# Patient Record
Sex: MALE | Race: WHITE | NOT HISPANIC OR LATINO | Employment: FULL TIME | URBAN - METROPOLITAN AREA
[De-identification: names, ages, dates, MRNs, and addresses within clinical notes are randomized per-mention and may not be internally consistent; named-entity substitution may affect disease eponyms.]

---

## 2017-02-08 DIAGNOSIS — J44.9 CHRONIC OBSTRUCTIVE PULMONARY DISEASE, UNSPECIFIED COPD TYPE (HCC): ICD-10-CM

## 2017-02-08 RX ORDER — BUDESONIDE AND FORMOTEROL FUMARATE DIHYDRATE 160; 4.5 UG/1; UG/1
AEROSOL RESPIRATORY (INHALATION)
Qty: 3 INHALER | Refills: 3 | Status: SHIPPED | OUTPATIENT
Start: 2017-02-08 | End: 2018-04-06 | Stop reason: SDUPTHER

## 2017-02-08 NOTE — TELEPHONE ENCOUNTER
Caller Name: Tony Gonsales                 Call Back Number: [phone] (home)         Patient approves a detailed voicemail message: N\A    Have we ever prescribed this med? Yes.  If yes, what date? 7/26/16    Last OV: 7/26/16    Next OV: No Pending Appointment     DX: COPD    Medications:  Current Outpatient Prescriptions   Medication Sig Dispense Refill   • montelukast (SINGULAIR) 10 MG Tab TAKE 1 TABLET NIGHTLY AT BEDTIME 90 Tab 0   • Azelastine HCl 0.15 % Solution USE 1 TO 2 SPRAYS IN EACH NOSTRIL TWICE A DAY 90 mL 0   • budesonide-formoterol (SYMBICORT) 160-4.5 MCG/ACT Aerosol Inhale 2 Puffs by mouth 2 Times a Day. Inhale 2 puffs by mouth twice daily with spacer.  Rinse mouth after each use.     • azithromycin (ZITHROMAX) 250 MG TABS a 6 Each 0   • benzonatate (TESSALON) 200 MG capsule Take 1 Cap by mouth 3 times a day as needed for Cough for 20 doses. 20 Cap 0   • fexofenadine (ALLEGRA) 180 MG tablet Take 180 mg by mouth every day.     • tiotropium (SPIRIVA) 18 MCG CAPS Inhale 18 mcg by mouth every day.     • esomeprazole (NEXIUM) 40 MG capsule Take 40 mg by mouth every morning before breakfast.     • mometasone (NASONEX) 50 MCG/ACT nasal spray Spray 2 Sprays in nose every day. Each nostril     • albuterol (VENTOLIN OR PROVENTIL) 108 (90 BASE) MCG/ACT AERS Inhale 2 Puffs by mouth every 6 hours as needed for Shortness of Breath. 8.5 g 3     No current facility-administered medications for this visit.

## 2017-02-27 ENCOUNTER — HOSPITAL ENCOUNTER (OUTPATIENT)
Dept: RADIOLOGY | Facility: MEDICAL CENTER | Age: 57
End: 2017-02-27
Attending: FAMILY MEDICINE
Payer: COMMERCIAL

## 2017-02-27 DIAGNOSIS — R05.9 COUGH: ICD-10-CM

## 2017-02-27 PROCEDURE — 71020 DX-CHEST-2 VIEWS: CPT

## 2017-04-03 RX ORDER — AZELASTINE HCL 205.5 UG/1
SPRAY NASAL
Qty: 90 ML | Refills: 6 | OUTPATIENT
Start: 2017-04-03

## 2017-05-10 DIAGNOSIS — J30.9 ALLERGIC RHINITIS, UNSPECIFIED ALLERGIC RHINITIS TRIGGER, UNSPECIFIED RHINITIS SEASONALITY: ICD-10-CM

## 2017-05-11 RX ORDER — MONTELUKAST SODIUM 10 MG/1
TABLET ORAL
Qty: 90 TAB | Refills: 0 | Status: SHIPPED | OUTPATIENT
Start: 2017-05-11 | End: 2017-08-18 | Stop reason: SDUPTHER

## 2017-05-11 NOTE — TELEPHONE ENCOUNTER
Caller Name: Tony Gonsales                 Call Back Number: [phone] (home)         Patient approves a detailed voicemail message: N\A    Have we ever prescribed this med? Yes.  If yes, what date? 12/30/16    Last OV: 11/3/15- Kim Shiraz    Next OV: 7/3/17- Kim Shiraz     DX: Allergic Rhinitis     Medications: Montelukast     Current Outpatient Prescriptions   Medication Sig Dispense Refill   • SYMBICORT 160-4.5 MCG/ACT Aerosol USE 2 INHALATIONS TWICE A DAY WITH SPACER RINSE MOUTH AFTER EACH USE 3 Inhaler 3   • montelukast (SINGULAIR) 10 MG Tab TAKE 1 TABLET NIGHTLY AT BEDTIME 90 Tab 0   • Azelastine HCl 0.15 % Solution USE 1 TO 2 SPRAYS IN EACH NOSTRIL TWICE A DAY 90 mL 0   • azithromycin (ZITHROMAX) 250 MG TABS a 6 Each 0   • benzonatate (TESSALON) 200 MG capsule Take 1 Cap by mouth 3 times a day as needed for Cough for 20 doses. 20 Cap 0   • fexofenadine (ALLEGRA) 180 MG tablet Take 180 mg by mouth every day.     • tiotropium (SPIRIVA) 18 MCG CAPS Inhale 18 mcg by mouth every day.     • esomeprazole (NEXIUM) 40 MG capsule Take 40 mg by mouth every morning before breakfast.     • mometasone (NASONEX) 50 MCG/ACT nasal spray Spray 2 Sprays in nose every day. Each nostril     • albuterol (VENTOLIN OR PROVENTIL) 108 (90 BASE) MCG/ACT AERS Inhale 2 Puffs by mouth every 6 hours as needed for Shortness of Breath. 8.5 g 3     No current facility-administered medications for this visit.

## 2017-05-24 ENCOUNTER — HOSPITAL ENCOUNTER (OUTPATIENT)
Dept: LAB | Facility: MEDICAL CENTER | Age: 57
End: 2017-05-24
Attending: SURGERY
Payer: COMMERCIAL

## 2017-05-24 ENCOUNTER — HOSPITAL ENCOUNTER (OUTPATIENT)
Dept: CARDIOLOGY | Facility: MEDICAL CENTER | Age: 57
End: 2017-05-24
Attending: SURGERY
Payer: COMMERCIAL

## 2017-05-24 LAB
ANION GAP SERPL CALC-SCNC: 8 MMOL/L (ref 0–11.9)
BUN SERPL-MCNC: 12 MG/DL (ref 8–22)
CALCIUM SERPL-MCNC: 8.9 MG/DL (ref 8.5–10.5)
CHLORIDE SERPL-SCNC: 107 MMOL/L (ref 96–112)
CO2 SERPL-SCNC: 24 MMOL/L (ref 20–33)
CREAT SERPL-MCNC: 0.89 MG/DL (ref 0.5–1.4)
EKG IMPRESSION: NORMAL
GFR SERPL CREATININE-BSD FRML MDRD: >60 ML/MIN/1.73 M 2
GLUCOSE SERPL-MCNC: 211 MG/DL (ref 65–99)
POTASSIUM SERPL-SCNC: 3.7 MMOL/L (ref 3.6–5.5)
SODIUM SERPL-SCNC: 139 MMOL/L (ref 135–145)

## 2017-05-24 PROCEDURE — 80048 BASIC METABOLIC PNL TOTAL CA: CPT

## 2017-05-24 PROCEDURE — 36415 COLL VENOUS BLD VENIPUNCTURE: CPT

## 2017-05-24 PROCEDURE — 93010 ELECTROCARDIOGRAM REPORT: CPT | Performed by: INTERNAL MEDICINE

## 2017-05-24 PROCEDURE — 93005 ELECTROCARDIOGRAM TRACING: CPT | Performed by: SURGERY

## 2017-06-02 ENCOUNTER — HOSPITAL ENCOUNTER (OUTPATIENT)
Dept: RADIOLOGY | Facility: MEDICAL CENTER | Age: 57
End: 2017-06-02
Attending: NURSE PRACTITIONER
Payer: COMMERCIAL

## 2017-06-02 VITALS
RESPIRATION RATE: 16 BRPM | DIASTOLIC BLOOD PRESSURE: 96 MMHG | HEIGHT: 76 IN | TEMPERATURE: 98 F | HEART RATE: 75 BPM | BODY MASS INDEX: 32.88 KG/M2 | OXYGEN SATURATION: 95 % | WEIGHT: 270 LBS | SYSTOLIC BLOOD PRESSURE: 145 MMHG

## 2017-06-02 DIAGNOSIS — M54.16 LUMBAR RADICULOPATHY: ICD-10-CM

## 2017-06-02 PROCEDURE — 72110 X-RAY EXAM L-2 SPINE 4/>VWS: CPT

## 2017-06-02 PROCEDURE — 99152 MOD SED SAME PHYS/QHP 5/>YRS: CPT

## 2017-06-02 PROCEDURE — 72148 MRI LUMBAR SPINE W/O DYE: CPT

## 2017-06-02 PROCEDURE — 700111 HCHG RX REV CODE 636 W/ 250 OVERRIDE (IP)

## 2017-06-02 ASSESSMENT — PAIN SCALES - GENERAL
PAINLEVEL_OUTOF10: 0

## 2017-06-02 NOTE — IP AVS SNAPSHOT
" <p align=\"LEFT\"><IMG SRC=\"[email or web address]\" alt=\"Image\" WIDTH=\"50%\" HEIGHT=\"200\" BORDER=\"\"></p>      `           Tony Gonsales   MRN: [id number]    Department:  [facility] MRI [address]   Date of Visit:              `  Discharge Instructions       MRI ADULT DISCHARGE INSTRUCTIONS    You have been medicated today for your scan. Please follow the instructions below to ensure your safe recovery. If you have any questions or problems, feel free to call us at [phone] or [phone].     1.   Have someone stay with you to assist you as needed.    2.   Do not drive or operate any mechanical devices.    3.   Do not perform any activity that requires concentration. Make no major decisions over the next 24 hours.     4.   Be careful changing positions from laying down to sitting or standing, as you may become dizzy.     5.   Do not drink alcohol for 48 hours.    6.   There are no restrictions for eating your normal meals. Drink fluids.    7.   You may continue your usual medications for pain, tranquilizers, muscle relaxants or sedatives when awake.     8.   Tomorrow, you may continue your normal daily activities.     9.   Pressure dressing on 10 - 15 minutes. If swelling or bleeding occurs when removed, continue placing direct pressure on injection site for another 5 minutes, or until bleeding stops.     I have been informed of and understand the above discharge instructions. Midazolam (VERSED)  What is this medicine?  You were given MIDAZOLAM (SEB shepherd) for your procedure today. This medication is a benzodiazepine. It is used to cause relaxation or sleep before surgery and to block the memory of the procedure.  This medicine may be used for other purposes; ask your health care provider or pharmacist if you have questions.  What side effects may I notice from receiving this medicine?  Side effects that you should report to your doctor or health care professional as soon as possible:  • allergic " reactions like skin rash, itching or hives, swelling of the face, lips, or tongue  • breathing problems  • confusion  • dizziness or lightheadedness  • fast, irregular heartbeat  • halluninations during recovery  • numbness or tingling in the hands or feet  • pain, redness, or swelling at site where injected  • seizures  Side effects that usually do not require medical attention (report to your doctor or health care professional if they continue or are bothersome):  • coughing  • headache  • hiccups  • involuntary eye and muscle movements  • loss of memory of events just before, during, and after use  • nausea, vomiting  • speech problems  • tiredness  • trouble sleeping or nightmares  This list may not describe all possible side effects. Call your doctor for medical advice about side effects. You may report side effects to FDA at 2-629-FDA-3306.    Fentanyl  What is this medicine?  You were given FENTANYL (FEN ta nil) for your procedure today, it is a pain reliever. It is used to treat breakthrough pain that your long acting pain medicine does not control. Do not use this medicine for a pain that will go away in a few days like pain from surgery, doctor or dentist visits.   This medicine may be used for other purposes; ask your health care provider or pharmacist if you have questions.  What side effects may I notice from receiving this medicine?  Side effects that you should report to your doctor or health care professional as soon as possible:  • allergic reactions like skin rash, itching or hives, swelling of the face, lips, or tongue  • breathing problems  • changes in vision  • confusion  • dry mouth  • feeling faint, lightheaded  • hallucination  • irregular heartbeat  • mouth pain, sores  • problems with balance, talking, walking  • trouble passing urine or change in the amount of urine  • unusual bleeding or bruising  • unusually weak or tired  Side effects that usually do not require medical attention  (report to your doctor or health care professional if they continue or are bothersome):  • dizzy  • headache  • loss of appetite  • nausea, vomiting  • sweating  • tingling in mouth  This list may not describe all possible side effects. Call your doctor for medical advice about side effects. You may report side effects to FDA at 8-966-DCE-5627.       `       Diet / Nutrition:    Follow any diet instructions given to you by your doctor or the dietician, including how much salt (sodium) you are allowed each day.    If you are overweight, talk to your doctor about a weight reduction plan.    Activity:    Remain physically active following your doctor's instructions about exercise and activity.    Rest often.     Any time you become even a little tired or short of breath, SIT DOWN and rest.    Worsening Symptoms:    Report any of the following signs and symptoms to the doctor's office immediately:    *Pain of jaw, arm, or neck  *Chest pain not relieved by medication                               *Dizziness or loss of consciousness  *Difficulty breathing even when at rest   *More tired than usual                                       *Bleeding drainage or swelling of surgical site  *Swelling of feet, ankles, legs or stomach                 *Fever (>100ºF)  *Pink or blood tinged sputum  *Weight gain (3lbs/day or 5lbs /week)           *Shock from internal defibrillator (if applicable)  *Palpitations or irregular heartbeats                *Cool and/or numb extremities    Stroke Awareness    Common Risk Factors for Stroke include:    Age  Atrial Fibrillation  Carotid Artery Stenosis  Diabetes Mellitus  Excessive alcohol consumption  High blood pressure  Overweight   Physical inactivity  Smoking    Warning signs and symptoms of a stroke include:    *Sudden numbness or weakness of the face, arm or leg (especially on one side of the body).  *Sudden confusion, trouble speaking or understanding.  *Sudden trouble seeing in one or  both eyes.  *Sudden trouble walking, dizziness, loss of balance or coordination.Sudden severe headache with no known cause.    It is very important to get treatment quickly when a stroke occurs. If you experience any of the above warning signs, call 911 immediately.                    `     Quit Smoking / Tobacco Use:    I understand the use of any tobacco products increases my chance of suffering from future heart disease or stroke and could cause other illnesses which may shorten my life. Quitting the use of tobacco products is the single most important thing I can do to improve my health. For further information on smoking / tobacco cessation call a Toll Free Quit Line at 1-616.283.4188 (*[employer] [place]) or 1-390.817.2145 ([employer]) or you can access the web based program at www.[employer].org.    [place] Tobacco Users Help Line:  (230) 537-8328       Toll Free: 1-598.742.5183  Quit Tobacco Program [facility] (891)442-4266    Crisis Hotline:    [place] Crisis Hotline:  9-606-VJGKIAA or 1-896.841.3024    [place] Crisis Hotline:    1-280.531.7782 or 454-325-2781    Discharge Survey:   Thank you for choosing [facility]. We hope we did everything we could to make your hospital stay a pleasant one. You may be receiving a phone survey and we would appreciate your time and participation in answering the questions. Your input is very valuable to us in our efforts to improve our service to our patients and their families.        My signature on this form indicates that:    1. I have reviewed and understand the above information.  2. My questions regarding this information have been answered to my satisfaction.  3. I have formulated a plan with my discharge nurse to obtain my prescribed medications for home.                   `           Patient or Caregiver Signature:  ____________________________________________________________    Date:   ____________________________________________________________       `

## 2017-06-02 NOTE — DISCHARGE INSTRUCTIONS
MRI ADULT DISCHARGE INSTRUCTIONS    You have been medicated today for your scan. Please follow the instructions below to ensure your safe recovery. If you have any questions or problems, feel free to call us at [phone] or [phone].     1.   Have someone stay with you to assist you as needed.    2.   Do not drive or operate any mechanical devices.    3.   Do not perform any activity that requires concentration. Make no major decisions over the next 24 hours.     4.   Be careful changing positions from laying down to sitting or standing, as you may become dizzy.     5.   Do not drink alcohol for 48 hours.    6.   There are no restrictions for eating your normal meals. Drink fluids.    7.   You may continue your usual medications for pain, tranquilizers, muscle relaxants or sedatives when awake.     8.   Tomorrow, you may continue your normal daily activities.     9.   Pressure dressing on 10 - 15 minutes. If swelling or bleeding occurs when removed, continue placing direct pressure on injection site for another 5 minutes, or until bleeding stops.     I have been informed of and understand the above discharge instructions. Midazolam (VERSED)  What is this medicine?  You were given MIDAZOLAM (SEB shepherd) for your procedure today. This medication is a benzodiazepine. It is used to cause relaxation or sleep before surgery and to block the memory of the procedure.  This medicine may be used for other purposes; ask your health care provider or pharmacist if you have questions.  What side effects may I notice from receiving this medicine?  Side effects that you should report to your doctor or health care professional as soon as possible:  • allergic reactions like skin rash, itching or hives, swelling of the face, lips, or tongue  • breathing problems  • confusion  • dizziness or lightheadedness  • fast, irregular heartbeat  • halluninations during recovery  • numbness or tingling in the hands or feet  • pain, redness,  or swelling at site where injected  • seizures  Side effects that usually do not require medical attention (report to your doctor or health care professional if they continue or are bothersome):  • coughing  • headache  • hiccups  • involuntary eye and muscle movements  • loss of memory of events just before, during, and after use  • nausea, vomiting  • speech problems  • tiredness  • trouble sleeping or nightmares  This list may not describe all possible side effects. Call your doctor for medical advice about side effects. You may report side effects to FDA at 1-175-IAE-3996.    Fentanyl  What is this medicine?  You were given FENTANYL (FEN ta nil) for your procedure today, it is a pain reliever. It is used to treat breakthrough pain that your long acting pain medicine does not control. Do not use this medicine for a pain that will go away in a few days like pain from surgery, doctor or dentist visits.   This medicine may be used for other purposes; ask your health care provider or pharmacist if you have questions.  What side effects may I notice from receiving this medicine?  Side effects that you should report to your doctor or health care professional as soon as possible:  • allergic reactions like skin rash, itching or hives, swelling of the face, lips, or tongue  • breathing problems  • changes in vision  • confusion  • dry mouth  • feeling faint, lightheaded  • hallucination  • irregular heartbeat  • mouth pain, sores  • problems with balance, talking, walking  • trouble passing urine or change in the amount of urine  • unusual bleeding or bruising  • unusually weak or tired  Side effects that usually do not require medical attention (report to your doctor or health care professional if they continue or are bothersome):  • dizzy  • headache  • loss of appetite  • nausea, vomiting  • sweating  • tingling in mouth  This list may not describe all possible side effects. Call your doctor for medical advice about  side effects. You may report side effects to FDA at 3-171-FDA-6737.

## 2017-07-03 ENCOUNTER — SLEEP CENTER VISIT (OUTPATIENT)
Dept: SLEEP MEDICINE | Facility: MEDICAL CENTER | Age: 57
End: 2017-07-03
Payer: COMMERCIAL

## 2017-07-03 VITALS
OXYGEN SATURATION: 93 % | WEIGHT: 270 LBS | HEART RATE: 95 BPM | RESPIRATION RATE: 18 BRPM | DIASTOLIC BLOOD PRESSURE: 100 MMHG | HEIGHT: 76 IN | BODY MASS INDEX: 32.88 KG/M2 | SYSTOLIC BLOOD PRESSURE: 150 MMHG

## 2017-07-03 DIAGNOSIS — G47.33 OSA (OBSTRUCTIVE SLEEP APNEA): ICD-10-CM

## 2017-07-03 DIAGNOSIS — J44.9 CHRONIC OBSTRUCTIVE PULMONARY DISEASE, UNSPECIFIED COPD TYPE (HCC): ICD-10-CM

## 2017-07-03 PROCEDURE — 99213 OFFICE O/P EST LOW 20 MIN: CPT | Performed by: NURSE PRACTITIONER

## 2017-07-03 RX ORDER — ATORVASTATIN CALCIUM 20 MG/1
40 TABLET, FILM COATED ORAL
COMMUNITY
Start: 2017-04-15
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
## 2017-07-03 NOTE — MR AVS SNAPSHOT
"        Tony Gonsales   7/3/2017 8:40 AM   Sleep Center Visit   MRN: [id number]    Department:  [facility]   Dept Phone:  [phone]    Description:  Male : 1960   Provider:  MEGHA Camp           Reason for Visit     Follow-Up LIAN      Allergies as of 7/3/2017     Allergen Noted Reactions    Codeine 2013       nausea    Tetracyclines 2013       nausea      Vital Signs     Blood Pressure Pulse Respirations Height Weight Body Mass Index    150/100 mmHg 95 18 1.93 m (6' 3.98\") 122.471 kg (270 lb) 32.88 kg/m2    Oxygen Saturation Smoking Status                93% Former Smoker          Basic Information     Date Of Birth Sex Race Ethnicity Preferred Language    1960 Male White Non- English      Your appointments     2017  8:00 AM   PROCEDURE 15 with Jakub Welsh M.D.   [facility] (--)    [address] [place] [address]   [phone]           Your procedure is scheduled at Special Procedures at [facility] located at [address] [place] [address] just east of the main [place]. Please check in at the front lobby desk 1 hour prior to your appointment time. For your safety, please have a ride home with a responsible adult.             Sep 19, 2017  8:00 AM   Follow UP with MEGHA Camp   [facility] (--)    [address] [place] [id number] [phone]              Problem List              ICD-10-CM Priority Class Noted - Resolved    Unstable angina (CMS-HCC) I20.0   5/15/2013 - Present      Health Maintenance        Date Due Completion Dates    IMM DTaP/Tdap/Td Vaccine (1 - Tdap) 1980 ---    COLONOSCOPY 2011 ---    IMM INFLUENZA (1) 2017 ---            Current Immunizations     Pneumococcal polysaccharide vaccine (PPSV-23) 5/15/2013 12:43 PM      Below and/or attached are the medications your provider expects you to take. Review all of your home medications and newly ordered medications " with your provider and/or pharmacist. Follow medication instructions as directed by your provider and/or pharmacist. Please keep your medication list with you and share with your provider. Update the information when medications are discontinued, doses are changed, or new medications (including over-the-counter products) are added; and carry medication information at all times in the event of emergency situations     Allergies:  CODEINE - (reactions not documented)     TETRACYCLINES - (reactions not documented)               Medications  Valid as of: July 03, 2017 -  8:53 AM    Generic Name Brand Name Tablet Size Instructions for use    Albuterol Sulfate (Aero Soln) albuterol 108 (90 BASE) MCG/ACT Inhale 2 Puffs by mouth every 6 hours as needed for Shortness of Breath.        Atorvastatin Calcium (Tab) LIPITOR 20 MG         Azelastine HCl (Solution) Azelastine HCl 0.15 % USE 1 TO 2 SPRAYS IN EACH NOSTRIL TWICE A DAY        Azithromycin (Tab) ZITHROMAX 250 MG a        Benzonatate (Cap) TESSALON 200 MG Take 1 Cap by mouth 3 times a day as needed for Cough for 20 doses.        Budesonide-Formoterol Fumarate (Aerosol) SYMBICORT 160-4.5 MCG/ACT USE 2 INHALATIONS TWICE A DAY WITH SPACER RINSE MOUTH AFTER EACH USE        Esomeprazole Magnesium (CAPSULE DELAYED RELEASE) NEXIUM 40 MG Take 40 mg by mouth every morning before breakfast.        Fexofenadine HCl (Tab) ALLEGRA 180 MG Take 180 mg by mouth every day.        Mometasone Furoate (Suspension) NASONEX 50 MCG/ACT Spray 2 Sprays in nose every day. Each nostril        Montelukast Sodium (Tab) SINGULAIR 10 MG TAKE 1 TABLET NIGHTLY AT BEDTIME (NEED OFFICE VISIT)        Tiotropium Bromide Monohydrate (Cap) SPIRIVA 18 MCG Inhale 18 mcg by mouth every day.        .                 Medicines prescribed today were sent to:     [facility] - [facility], [place] [id number] [place] [address] [facility] [address]    Phone: [phone] Fax: [phone]      Open 24 Hours?: No      Medication refill instructions:       If your prescription bottle indicates you have medication refills left, it is not necessary to call your provider’s office. Please contact your pharmacy and they will refill your medication.    If your prescription bottle indicates you do not have any refills left, you may request refills at any time through one of the following ways: The online Scour Prevention system (except Urgent Care), by calling your provider’s office, or by asking your pharmacy to contact your provider’s office with a refill request. Medication refills are processed only during regular business hours and may not be available until the next business day. Your provider may request additional information or to have a follow-up visit with you prior to refilling your medication.   *Please Note: Medication refills are assigned a new Rx number when refilled electronically. Your pharmacy may indicate that no refills were authorized even though a new prescription for the same medication is available at the pharmacy. Please request the medicine by name with the pharmacy before contacting your provider for a refill.        Instructions    1. Order for new CPAP 7 cm with heated humidification and associated supplies.  2. Patient is encouraged to use his machine every night for as long as possible.  3. Clean equipment weekly replacement supplies as allowed by insurance.            Scour Prevention Access Code: [email or web address]  Expires: 8/2/2017  8:21 AM    Scour Prevention  A secure, online tool to manage your health information     [employer]’s Scour Prevention® is a secure, online tool that connects you to your personalized health information from the privacy of your home -- day or night - making it very easy for you to manage your healthcare. Once the activation process is completed, you can even access your medical information using the Scour Prevention eugene, which is available for free in the Apple Eugene store or Google Play  store.     Swopboard provides the following levels of access (as shown below):   My Chart Features   [facility] Doctor [facility]  Specialists [facility]  Urgent  Care Non-[facility]  Primary Care  Doctor   Email your healthcare team securely and privately 24/7 X X X    Manage appointments: schedule your next appointment; view details of past/upcoming appointments X      Request prescription refills. X      View recent personal medical records, including lab and immunizations X X X X   View health record, including health history, allergies, medications X X X X   Read reports about your outpatient visits, procedures, consult and ER notes X X X X   See your discharge summary, which is a recap of your hospital and/or ER visit that includes your diagnosis, lab results, and care plan. X X       How to register for Swopboard:  1. Go to  [email or web address].  2. Click on the Sign Up Now box, which takes you to the New Member Sign Up page. You will need to provide the following information:  a. Enter your Swopboard Access Code exactly as it appears at the top of this page. (You will not need to use this code after you’ve completed the sign-up process. If you do not sign up before the expiration date, you must request a new code.)   b. Enter your date of birth.   c. Enter your home email address.   d. Click Submit, and follow the next screen’s instructions.  3. Create a Swopboard ID. This will be your Swopboard login ID and cannot be changed, so think of one that is secure and easy to remember.  4. Create a Swopboard password. You can change your password at any time.  5. Enter your Password Reset Question and Answer. This can be used at a later time if you forget your password.   6. Enter your e-mail address. This allows you to receive e-mail notifications when new information is available in Swopboard.  7. Click Sign Up. You can now view your health information.    For assistance activating your Swopboard account, call [phone]

## 2017-07-03 NOTE — PATIENT INSTRUCTIONS
1. Order for new CPAP 7 cm with heated humidification and associated supplies.  2. Patient is encouraged to use his machine every night for as long as possible.  3. Clean equipment weekly replacement supplies as allowed by insurance.

## 2017-07-17 ENCOUNTER — APPOINTMENT (OUTPATIENT)
Dept: PAIN MANAGEMENT | Facility: REHABILITATION | Age: 57
End: 2017-07-17
Attending: ANESTHESIOLOGY
Payer: COMMERCIAL

## 2017-08-18 DIAGNOSIS — J30.89 ALLERGIC RHINITIS DUE TO OTHER ALLERGIC TRIGGER, UNSPECIFIED RHINITIS SEASONALITY: ICD-10-CM

## 2017-08-21 RX ORDER — MONTELUKAST SODIUM 10 MG/1
TABLET ORAL
Qty: 90 TAB | Refills: 3 | Status: SHIPPED | OUTPATIENT
Start: 2017-08-21 | End: 2020-10-04

## 2017-08-21 NOTE — TELEPHONE ENCOUNTER
Have we ever prescribed this med? Yes.  If yes, what date? 05/11/2017    Last OV: 07/03/2017 - Kim Weldon     Next OV: 09/19/2017 - Kim Weldon     DX: Allergic Rhinitis    Medications: Singulair

## 2017-09-19 ENCOUNTER — SLEEP CENTER VISIT (OUTPATIENT)
Dept: SLEEP MEDICINE | Facility: MEDICAL CENTER | Age: 57
End: 2017-09-19
Payer: COMMERCIAL

## 2017-09-19 VITALS
HEIGHT: 75 IN | OXYGEN SATURATION: 95 % | RESPIRATION RATE: 16 BRPM | TEMPERATURE: 99 F | WEIGHT: 272 LBS | DIASTOLIC BLOOD PRESSURE: 102 MMHG | SYSTOLIC BLOOD PRESSURE: 150 MMHG | BODY MASS INDEX: 33.82 KG/M2 | HEART RATE: 97 BPM

## 2017-09-19 DIAGNOSIS — G47.33 OSA (OBSTRUCTIVE SLEEP APNEA): ICD-10-CM

## 2017-09-19 DIAGNOSIS — J44.9 CHRONIC OBSTRUCTIVE PULMONARY DISEASE, UNSPECIFIED COPD TYPE (HCC): ICD-10-CM

## 2017-09-19 PROCEDURE — 99213 OFFICE O/P EST LOW 20 MIN: CPT | Performed by: NURSE PRACTITIONER

## 2017-09-19 NOTE — PROGRESS NOTES
Chief Complaint   Patient presents with   • Apnea     Follow Up         HPI:  This is a 57 y.o. male with a history of chronic obstructive pulmonary disease and obstructive sleep apnea. Pulmonary function tests from 11/20/15 indicates FEV1 3.37 L, 72% predicted, FEV1/FVC 71%, and DLCO 109% predicted. The patient is compliant with Symbicort 160/4.5 µg 2 inhalations twice daily, Singulair daily, Allegra daily, and Ventolin and albuterol nebulized treatments as needed. The patient's been doing fairly well. We've had some windy days which will smoke and the area SCDs breathing quite nicely. He denies fevers, chills, sweats.    Polysomnogram indicates AHI 16.2 and minimum saturation 81%. The patient is compliant with APAP 5-12 cm. the patient is using his machine 97% of the time for 6 hours 18 minutes. His AHI is been reduced to 0.2. His average pressure is 11 cm. We will increase his pressure to 5-15 centimeters. He has no problems tolerating his mask or pressure. He has been having some back pain which has been interfering with the amount of sleep ease been obtaining here recently.    Past Medical History:   Diagnosis Date   • LIAN (obstructive sleep apnea) 7/3/2017    AHI 16.2, minimum saturation 81%, on APAP 5-12 cm.   • COPD (chronic obstructive pulmonary disease) (CMS-[facility]) 7/3/2017   • Allergic rhinitis    • Chronic back pain    • COPD    • GERD (gastroesophageal reflux disease)    • Sleep apnea        Past Surgical History:   Procedure Laterality Date   • OH SPINE SURGERY PROCEDURE UNLISTED  1989   • OTHER      Knee Surgery       Social History   Substance Use Topics   • Smoking status: Former Smoker     Packs/day: 1.00     Years: 35.00     Types: Cigarettes     Start date: 1/1/1975     Quit date: 9/14/2012   • Smokeless tobacco: Never Used   • Alcohol use 0.6 - 1.8 oz/week     1 - 3 Cans of beer per week      Comment: occasionally       ROS:   Constitutional: Denies fevers, chills, sweats, fatigue, and weight  "loss.  Eyes: Denies glasses.  Ears/nose/mouth/throat: Denies injury.  Cardiovascular: Denies chest pain, tightness.  Respiratory: See history of present illness.  GI: Denies heartburn, difficulty swallowing, nausea, and vomiting.  Neurological: Denies frequent headaches, dizziness, weakness.    Vitals:  Vitals:    09/19/17 0802   Height: 1.905 m (6' 3\")   Weight: 123.4 kg (272 lb)   Weight % change since last entry.: 0 %   BP: 150/102   Pulse: 97   BMI (Calculated): 34   Resp: 16   Temp: 37.2 °C (99 °F)   O2 sat % room air: 95 %       Allergies:  Codeine and Tetracyclines    Medications:  Current Outpatient Prescriptions   Medication Sig Dispense Refill   • montelukast (SINGULAIR) 10 MG Tab TAKE 1 TABLET NIGHTLY AT BEDTIME (NEED OFFICE VISIT) 90 Tab 3   • atorvastatin (LIPITOR) 20 MG Tab      • SYMBICORT 160-4.5 MCG/ACT Aerosol USE 2 INHALATIONS TWICE A DAY WITH SPACER RINSE MOUTH AFTER EACH USE 3 Inhaler 3   • Azelastine HCl 0.15 % Solution USE 1 TO 2 SPRAYS IN EACH NOSTRIL TWICE A DAY 90 mL 0   • azithromycin (ZITHROMAX) 250 MG TABS a 6 Each 0   • benzonatate (TESSALON) 200 MG capsule Take 1 Cap by mouth 3 times a day as needed for Cough for 20 doses. 20 Cap 0   • fexofenadine (ALLEGRA) 180 MG tablet Take 180 mg by mouth every day.     • tiotropium (SPIRIVA) 18 MCG CAPS Inhale 18 mcg by mouth every day.     • esomeprazole (NEXIUM) 40 MG capsule Take 40 mg by mouth every morning before breakfast.     • mometasone (NASONEX) 50 MCG/ACT nasal spray Spray 2 Sprays in nose every day. Each nostril     • albuterol (VENTOLIN OR PROVENTIL) 108 (90 BASE) MCG/ACT AERS Inhale 2 Puffs by mouth every 6 hours as needed for Shortness of Breath. 8.5 g 3     No current facility-administered medications for this visit.        PHYSICAL EXAM:  Appearance: Well-developed, well-nourished, no acute distress.  Eyes. PERRL.  Hearing: Grossly intact.  Oropharynx: Tongue normal, posterior pharynx without erythema or exudate.  Respiratory " effort: No intercostal retractions or use of accessory muscles.  Lung auscultation: No crackles, wheezing.  Heart auscultation: No murmur, gallop, or rub. Regular rate and rhythm.  Extremities: No cyanosis or edema.  Gait and Station: Normal  Orientation: Oriented to time, place, and person.    Assessment:  1. Chronic obstructive pulmonary disease, unspecified COPD type (CMS-HCC)     2. LIAN (obstructive sleep apnea)           Plan:  1. Continue APAP 5-15 centimeters.  2. Clean equipment weekly and replace supplies as allowed by insurance.    Return in about 6 months (around 3/19/2018) for With NESTOR Tijerina.

## 2017-09-19 NOTE — PATIENT INSTRUCTIONS
1. Continue APAP 5-15 centimeters.  2. Clean equipment weekly and replace supplies as allowed by insurance.

## 2018-10-18 ENCOUNTER — HOSPITAL ENCOUNTER (OUTPATIENT)
Dept: RADIOLOGY | Facility: MEDICAL CENTER | Age: 58
End: 2018-10-18
Attending: NEUROLOGICAL SURGERY
Payer: COMMERCIAL

## 2018-10-18 DIAGNOSIS — M48.062 SPINAL STENOSIS, LUMBAR REGION, WITH NEUROGENIC CLAUDICATION: ICD-10-CM

## 2018-10-18 PROCEDURE — 72110 X-RAY EXAM L-2 SPINE 4/>VWS: CPT

## 2020-10-04 ENCOUNTER — HOSPITAL ENCOUNTER (EMERGENCY)
Facility: MEDICAL CENTER | Age: 60
End: 2020-10-04
Attending: EMERGENCY MEDICINE
Payer: COMMERCIAL

## 2020-10-04 VITALS
HEART RATE: 87 BPM | HEIGHT: 77 IN | WEIGHT: 273.59 LBS | RESPIRATION RATE: 18 BRPM | OXYGEN SATURATION: 95 % | TEMPERATURE: 98 F | SYSTOLIC BLOOD PRESSURE: 118 MMHG | BODY MASS INDEX: 32.3 KG/M2 | DIASTOLIC BLOOD PRESSURE: 61 MMHG

## 2020-10-04 DIAGNOSIS — S61.411A LACERATION OF RIGHT HAND, FOREIGN BODY PRESENCE UNSPECIFIED, INITIAL ENCOUNTER: ICD-10-CM

## 2020-10-04 PROCEDURE — 99281 EMR DPT VST MAYX REQ PHY/QHP: CPT

## 2020-10-04 PROCEDURE — 304217 HCHG IRRIGATION SYSTEM

## 2020-10-04 PROCEDURE — 303353 HCHG DERMABOND SKIN ADHESIVE

## 2020-10-04 PROCEDURE — 304999 HCHG REPAIR-SIMPLE/INTERMED LEVEL 1

## 2020-10-04 RX ORDER — PRASUGREL 10 MG/1
10 TABLET, FILM COATED ORAL DAILY
COMMUNITY

## 2020-10-04 NOTE — LETTER
"  FORM C-4:  EMPLOYEE’S CLAIM FOR COMPENSATION/ REPORT OF INITIAL TREATMENT  EMPLOYEE’S CLAIM - PROVIDE ALL INFORMATION REQUESTED   First Name Tony Last Name Dru Birthdate 1960  Sex male Claim Number   Home Address [address]             Zip [address]                                   Age  60 y.o. Height  1.943 m (6' 4.5\") Weight  124.1 kg (273 lb 9.5 oz) [facility]     Mailing Address [address]              Zip [address] Telephone  [phone] (home) [phone] (work) Primary Language Spoken  English   Insurer   Third Party   [employer] Employee's Occupation (Job Title) When Injury or Occupational Disease Occurred     Employer's Name [employer] Telephone [phone]    Employer Address [address] [facility]] Zip [address]   Date of Injury  10/4/2020       Hour of Injury  8:00 PM Date Employer Notified  10/4/2020 Last Day of Work after Injury or Occupational Disease  10/4/2020 Supervisor to Whom Injury Reported  Denver Miller   Address or Location of Accident (if applicable)    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     How did this injury or occupational disease occur? Be specific and answer in detail. Use additional sheet if necessary)  Cut my hand on winding pin after removing E/ blade while wearing CVT gloves   If you believe that you have an occupational disease, when did you first have knowledge of the disability and it relationship to your employment? no Witnesses to the Accident  na   Nature of Injury or Occupational Disease  Laceration Part(s) of Body Injured or Affected  Hand (R), N/A, N/A    I CERTIFY THAT THE ABOVE IS TRUE AND CORRECT TO THE BEST OF MY KNOWLEDGE AND THAT I HAVE PROVIDED THIS INFORMATION IN ORDER TO OBTAIN THE BENEFITS OF [place] " [place]).  I HEREBY AUTHORIZE ANY PHYSICIAN, CHIROPRACTOR, SURGEON, PRACTITIONER, OR OTHER PERSON, ANY HOSPITAL, INCLUDING [facility] OR [facility], ANY MEDICAL SERVICE ORGANIZATION, ANY INSURANCE COMPANY, OR OTHER INSTITUTION OR ORGANIZATION TO RELEASE TO EACH OTHER, ANY MEDICAL OR OTHER INFORMATION, INCLUDING BENEFITS PAID OR PAYABLE, PERTINENT TO THIS INJURY OR DISEASE, EXCEPT INFORMATION RELATIVE TO DIAGNOSIS, TREATMENT AND/OR COUNSELING FOR AIDS, PSYCHOLOGICAL CONDITIONS, ALCOHOL OR CONTROLLED SUBSTANCES, FOR WHICH I MUST GIVE SPECIFIC AUTHORIZATION.  A PHOTOSTAT OF THIS AUTHORIZATION SHALL BE AS VALID AS THE ORIGINAL.  Date  October 04, 2020      Place  [facility]      Employee’s Signature   THIS REPORT MUST BE COMPLETED AND MAILED WITHIN 3 WORKING DAYS OF TREATMENT   Place [facility], EMERGENCY DEPT                       Name of Facility [facility]   Date  10/4/2020 Diagnosis  (S61.411A) Laceration of right hand, foreign body presence unspecified, initial encounter Is there evidence the injured employee was under the influence of alcohol and/or another controlled substance at the time of accident?   Hour  11:01 PM Description of Injury or Disease  Laceration of right hand, foreign body presence unspecified, initial encounter No   Treatment  Wound irrigation and repair  Have you advised the patient to remain off work five days or more?         No   X-Ray Findings    Comments:n/a If Yes   From Date    To Date      From information given by the employee, together with medical evidence, can you directly connect this injury or occupational disease as job incurred? Yes If No, is employee capable of: Full Duty  Yes Modified Duty  Yes   Is additional medical care by a physician indicated? No If Modified Duty, Specify any Limitations / Restrictions       Do you know of any previous  "injury or disease contributing to this condition or occupational disease? No    Date 10/4/2020 Print Doctor’s Name Kamla Dunlap certify the employer’s copy of this form was mailed on:   Address [address] [id number] [phone] INSURER’S USE ONLY   Provider’s Tax ID Number   Telephone Dept: [phone]    Doctor’s Signature cristiana-KAMLA Velazquez M.D. Degree        Form C-4 (rev.10/07)                                                                         BRIEF DESCRIPTION OF RIGHTS AND BENEFITS  ([place]ursuant to [place] 616[place].050)    Notice of Injury or Occupational Disease (Incident Report Form C-1): If an injury or occupational disease (OD) arises out of and in the course of employment, you must provide written notice to your employer as soon as practicable, but no later than 7 days after the accident or OD. Your employer shall maintain a sufficient supply of the required forms.    Claim for Compensation (Form C-4): If medical treatment is sought, the form C-4 is available at the place of initial treatment. A completed \"Claim for Compensation\" (Form C-4) must be filed within 90 days after an accident or OD. The treating physician or chiropractor must, within 3 working days after treatment, complete and mail to the employer, the employer's insurer and third-party , the Claim for Compensation.    Medical Treatment: If you require medical treatment for your on-the-job injury or OD, you may be required to select a physician or chiropractor from a list provided by your workers’ compensation insurer, if it has contracted with an Organization for Managed Care (MCO) or Preferred Provider Organization (PPO) or providers of health care. If your employer has not entered into a contract with an MCO or PPO, you may select a physician or chiropractor from the Panel of Physicians and Chiropractors. Any medical costs related to your industrial injury or OD will be paid by your " insurer.    Temporary Total Disability (TTD): If your doctor has certified that you are unable to work for a period of at least 5 consecutive days, or 5 cumulative days in a 20-day period, or places restrictions on you that your employer does not accommodate, you may be entitled to TTD compensation.    Temporary Partial Disability (TPD): If the wage you receive upon reemployment is less than the compensation for TTD to which you are entitled, the insurer may be required to pay you TPD compensation to make up the difference. TPD can only be paid for a maximum of 24 months.    Permanent Partial Disability (PPD): When your medical condition is stable and there is an indication of a PPD as a result of your injury or OD, within 30 days, your insurer must arrange for an evaluation by a rating physician or chiropractor to determine the degree of your PPD. The amount of your PPD award depends on the date of injury, the results of the PPD evaluation and your age and wage.    Permanent Total Disability (PTD): If you are medically certified by a treating physician or chiropractor as permanently and totally disabled and have been granted a PTD status by your insurer, you are entitled to receive monthly benefits not to exceed 66 2/3% of your average monthly wage. The amount of your PTD payments is subject to reduction if you previously received a PPD award.    Vocational Rehabilitation Services: You may be eligible for vocational rehabilitation services if you are unable to return to the job due to a permanent physical impairment or permanent restrictions as a result of your injury or occupational disease.    Transportation and Per Liz Reimbursement: You may be eligible for travel expenses and per liz associated with medical treatment.    Reopening: You may be able to reopen your claim if your condition worsens after claim closure.     Appeal Process: If you disagree with a written determination issued by the insurer or the  insurer does not respond to your request, you may appeal to the Department of Administration, , by following the instructions contained in your determination letter. You must appeal the determination within 70 days from the date of the determination letter at [address], [address], [place], [place] [address], or [address], [place], [place] [address]. If you disagree with the  decision, you may appeal to the Department of Administration, . You must file your appeal within 30 days from the date of the  decision letter at [address], [address], [place], [place] [address], or [address], [address], [place], [place] [address]. If you disagree with a decision of an , you may file a petition for judicial review with the District Court. You must do so within 30 days of the Appeal Officer’s decision. You may be represented by an  at your own expense or you may contact the [facility] for possible representation.    [place]  for Injured Workers (NAIW): If you disagree with a  decision, you may request that NAIW represent you without charge at an  Hearing. For information regarding denial of benefits, you may contact the [facility] at: [address], [address], [place] [address], [phone], or [address], [address], [place] [address], [phone]    To File a Complaint with the Division: If you wish to file a complaint with the  of the Division of Industrial Relations (DIR),  please contact the Workers’ Compensation Section, [address], [address], [place], [place] [address], telephone [phone]) [phone], or [address], [place], [place] [address], telephone [phone].    For assistance with Workers’ Compensation Issues: You may contact the Office of the Governor Consumer Health Assistance, [address], [place], [place] [address], Toll Free 1-957.592.1815, Web site: [email or web address]., E-mail [email or web address].  D-2 (rev. 06/18)              __________________________________________________________________                                    _________________            Employee Name / Signature                                                                                                                            Date

## 2020-10-05 ASSESSMENT — ENCOUNTER SYMPTOMS
ROS SKIN COMMENTS: POSITIVE FOR LACERATION
SENSORY CHANGE: 0

## 2020-10-05 NOTE — ED PROVIDER NOTES
"ED Provider Note    Primary care provider: Feng Roca M.D.  Means of arrival: private vehicle  History obtained from: patient  History limited by: none    CHIEF COMPLAINT  Chief Complaint   Patient presents with   • Laceration     cut hand of \"a winding pin\"  lac to webbing R hand 4 and 5 digits          HPI  Tony Gonsales is a 60 y.o. male who presents to the Emergency Department for laceration.  Patient reports that a winding pin at work lacerated his right hand.  He sustained a laceration between the webspace of the fourth and fifth digits.  Denies any other injuries.  Denies numbness, tingling, weakness or difficulty moving his hand.    REVIEW OF SYSTEMS  Review of Systems   Skin:        Positive for laceration   Neurological: Negative for sensory change.         PAST MEDICAL HISTORY   has a past medical history of Allergic rhinitis, Chronic back pain, COPD, COPD (chronic obstructive pulmonary disease) ([facility]) (7/3/2017), GERD (gastroesophageal reflux disease), LIAN (obstructive sleep apnea) (7/3/2017), and Sleep apnea.    SURGICAL HISTORY   has a past surgical history that includes spine surgery procedure unlisted () and other.    SOCIAL HISTORY  Social History     Tobacco Use   • Smoking status: Former Smoker     Packs/day: 1.00     Years: 35.00     Pack years: 35.00     Types: Cigarettes     Start date: 1975     Quit date: 2012     Years since quittin.0   • Smokeless tobacco: Never Used   Substance Use Topics   • Alcohol use: Yes     Alcohol/week: 0.6 - 1.2 oz     Types: 1 - 2 Cans of beer per week     Comment: occasionally   • Drug use: No      Social History     Substance and Sexual Activity   Drug Use No       FAMILY HISTORY  Family History   Problem Relation Age of Onset   • Sleep Apnea Neg Hx        CURRENT MEDICATIONS  Home Medications     Reviewed by Edith Romero R.N. (Registered Nurse) on 10/04/20 at 2140  Med List Status: Unable to Obtain   Medication Last Dose " "Status   albuterol (VENTOLIN OR PROVENTIL) 108 (90 BASE) MCG/ACT AERS prn Active   aspirin EC (ECOTRIN) 81 MG Tablet Delayed Response 10/4/2020 Active   atorvastatin (LIPITOR) 20 MG Tab 10/4/2020 Active   benzonatate (TESSALON) 200 MG capsule prn Active   CARVEDILOL PO 10/4/2020 Active   fexofenadine (ALLEGRA) 180 MG tablet 10/4/2020 Active   Fluticasone Propionate (FLONASE NA) 10/4/2020 Active   LISINOPRIL PO 10/4/2020 Active   prasugrel (EFFIENT) 10 MG Tab 10/4/2020 Active   SYMBICORT 160-4.5 MCG/ACT Aerosol 10/4/2020 Active                ALLERGIES  Allergies   Allergen Reactions   • Codeine      nausea   • Tetracyclines      nausea       PHYSICAL EXAM  VITAL SIGNS: /101   Pulse (!) 105   Temp 36.7 °C (98 °F) (Temporal)   Resp 18   Ht 1.943 m (6' 4.5\")   Wt 124.1 kg (273 lb 9.5 oz)   SpO2 95%   BMI 32.87 kg/m²   Vitals reviewed by myself.  Physical Exam   Constitutional: He is well-developed, well-nourished, and in no distress. No distress.   HENT:   Head: Normocephalic.   Eyes: EOM are normal.   Neck: Normal range of motion.   Cardiovascular:   Tachycardic rate, regular rhythm, brisk capillary refill present in all distal fingertips   Pulmonary/Chest: Effort normal.   Musculoskeletal: Normal range of motion.      Comments: Patient has full range of motion of all digits at the MCP, PIP and DIP joints.  5 out of 5  strength bilaterally   Neurological:   Sensation intact in all distal fingertips   Skin:   1 cm laceration noted to the dorsal aspect of the right hand between the fourth and fifth digits         DIAGNOSTIC STUDIES /  LABS  None    PROCEDURES  Laceration Repair Procedure Note    Indication: Laceration to right hand    Procedure: The patient was placed in the appropriate position and anesthesia around the laceration was not indicated The area was then irrigated with normal saline. The laceration was closed with Dermabond. There were no additional lacerations requiring repair. The wound " area was then dressed with a bandage.      Total repaired wound length: 1 cm.     Other Items: None    The patient tolerated the procedure well.    Complications: None        COURSE & MEDICAL DECISION MAKING  Nursing notes, VS, PMSFHx reviewed in chart.    Patient is a 60-year-old male who comes in for evaluation of laceration.  On physical exam patient appears to have a simple laceration.  There is no deep tendon injury or neurovascular injury.  He is neurovascularly intact.  Vitals notable for slight tachycardia which resolves without intervention.  Therefore wound is irrigated and repaired with Dermabond, please see procedure note above for details.  Patient is given wound care instructions and discharged in stable condition.      FINAL IMPRESSION  1. Laceration of right hand, foreign body presence unspecified, initial encounter

## 2020-10-05 NOTE — ED TRIAGE NOTES
"Pt comes in for cut between 4 and 5th digit in webbing while he was at  work  Cut R hand w/ a \"winding pin\"   "

## 2021-05-20 ENCOUNTER — APPOINTMENT (OUTPATIENT)
Dept: RADIOLOGY | Facility: MEDICAL CENTER | Age: 61
End: 2021-05-20
Attending: ORTHOPAEDIC SURGERY
Payer: COMMERCIAL

## 2021-05-20 DIAGNOSIS — S66.319A: ICD-10-CM

## 2021-06-11 ENCOUNTER — HOSPITAL ENCOUNTER (OUTPATIENT)
Dept: RADIOLOGY | Facility: MEDICAL CENTER | Age: 61
End: 2021-06-11
Attending: ORTHOPAEDIC SURGERY
Payer: COMMERCIAL

## 2021-06-11 PROCEDURE — 73218 MRI UPPER EXTREMITY W/O DYE: CPT | Mod: RT

## 2023-06-29 ENCOUNTER — HOSPITAL ENCOUNTER (OUTPATIENT)
Dept: RADIOLOGY | Facility: MEDICAL CENTER | Age: 63
End: 2023-06-29
Attending: ORTHOPAEDIC SURGERY
Payer: COMMERCIAL

## 2023-06-29 DIAGNOSIS — M75.121 COMPLETE TEAR OF RIGHT ROTATOR CUFF, UNSPECIFIED WHETHER TRAUMATIC: ICD-10-CM

## 2023-06-29 PROCEDURE — 73221 MRI JOINT UPR EXTREM W/O DYE: CPT | Mod: RT
